# Patient Record
Sex: MALE | Race: BLACK OR AFRICAN AMERICAN | Employment: UNEMPLOYED
[De-identification: names, ages, dates, MRNs, and addresses within clinical notes are randomized per-mention and may not be internally consistent; named-entity substitution may affect disease eponyms.]

---

## 2020-11-17 ENCOUNTER — TRANSCRIBE ORDERS (OUTPATIENT)
Dept: OTHER | Age: 50
End: 2020-11-17

## 2020-11-17 DIAGNOSIS — N52.9 ED (ERECTILE DYSFUNCTION): Primary | ICD-10-CM

## 2020-11-20 NOTE — TELEPHONE ENCOUNTER
MEDICAL RECORDS REQUEST   [facility]  Urology Clinic  [address] [place] [address]  PHONE: [phone]  Fax: [phone]        FUTURE VISIT INFORMATION                                                   Garrett Jewell, : 1970 scheduled for future visit at [facility]    APPOINTMENT INFORMATION:    Date: 12/15/20     Provider:  Karli Lee PA-C    Reason for Visit/Diagnosis: ED    REFERRAL INFORMATION:    Referring provider:  N/A    Specialty: N/A    Referring providers clinic:  [facility] contact number:  N/A    RECORDS REQUESTED FOR VISIT                                                     NOTES  STATUS/DETAILS   OFFICE NOTE from referring provider  yes   OFFICE NOTE from other specialist  no   DISCHARGE SUMMARY from hospital  no   DISCHARGE REPORT from the ER  no   OPERATIVE REPORT  no   MEDICATION LIST  no     PRE-VISIT CHECKLIST      Record collection complete Yes   Appointment appropriately scheduled           (right time/right provider) Yes   MyChart activation If no, please explain: In process   Questionnaire complete If no, please explain: In process      Completed by: Jazmyn Vaughn

## 2020-12-07 ENCOUNTER — PRE VISIT (OUTPATIENT)
Dept: UROLOGY | Facility: CLINIC | Age: 50
End: 2020-12-07

## 2020-12-07 NOTE — TELEPHONE ENCOUNTER
Visit Type : erectile dysfunction    Records/Orders: in epic     Pt Contacted: no    At Rooming: video

## 2020-12-15 ENCOUNTER — PRE VISIT (OUTPATIENT)
Dept: UROLOGY | Facility: CLINIC | Age: 50
End: 2020-12-15

## 2020-12-15 ENCOUNTER — VIRTUAL VISIT (OUTPATIENT)
Dept: UROLOGY | Facility: CLINIC | Age: 50
End: 2020-12-15
Payer: COMMERCIAL

## 2020-12-15 DIAGNOSIS — S14.107S: ICD-10-CM

## 2020-12-15 DIAGNOSIS — Z31.41 FERTILITY TESTING: Primary | ICD-10-CM

## 2020-12-15 PROCEDURE — 99203 OFFICE O/P NEW LOW 30 MIN: CPT | Mod: 95 | Performed by: PHYSICIAN ASSISTANT

## 2020-12-15 RX ORDER — SILDENAFIL 100 MG/1
50 TABLET, FILM COATED ORAL
COMMUNITY
Start: 2020-11-05

## 2020-12-15 RX ORDER — ASPIRIN 325 MG
325 TABLET ORAL
COMMUNITY
Start: 2020-09-18

## 2020-12-15 RX ORDER — TRIAMCINOLONE ACETONIDE 1 MG/G
OINTMENT TOPICAL
COMMUNITY
Start: 2020-09-14

## 2020-12-15 RX ORDER — ACETAMINOPHEN 500 MG
500-1000 TABLET ORAL
COMMUNITY
Start: 2020-11-30

## 2020-12-15 RX ORDER — SIMETHICONE 80 MG
80 TABLET,CHEWABLE ORAL
COMMUNITY
Start: 2020-09-18

## 2020-12-15 RX ORDER — LANOLIN ALCOHOL/MO/W.PET/CERES
3 CREAM (GRAM) TOPICAL
COMMUNITY
Start: 2020-11-30

## 2020-12-15 RX ORDER — TIZANIDINE 2 MG/1
TABLET ORAL
COMMUNITY
Start: 2020-10-05

## 2020-12-15 RX ORDER — GABAPENTIN 100 MG/1
200 CAPSULE ORAL
COMMUNITY
Start: 2020-09-18

## 2020-12-15 ASSESSMENT — PAIN SCALES - GENERAL: PAINLEVEL: NO PAIN (0)

## 2020-12-15 NOTE — LETTER
"12/15/2020       RE: Garrett Jewell  [address]  [facility] [address]     Dear Colleague,    Thank you for referring your patient, Garrett Jewell, to the [facility] UROLOGY CLINIC [place] at [facility]. Please see a copy of my visit note below.    Video Visit Technology for this patient: Well Video Visit- Patient was left in waiting room   Garrett Jewell is a 50 year old male who is being evaluated via a billable video visit.      The patient has been notified of following:     \"This video visit will be conducted via a call between you and your physician/provider. We have found that certain health care needs can be provided without the need for an in-person physical exam.  This service lets us provide the care you need with a video conversation.  If a prescription is necessary we can send it directly to your pharmacy.  If lab work is needed we can place an order for that and you can then stop by our lab to have the test done at a later time.    Video visits are billed at different rates depending on your insurance coverage.  Please reach out to your insurance provider with any questions.    If during the course of the call the physician/provider feels a video visit is not appropriate, you will not be charged for this service.\"    Patient has given verbal consent for Video visit? Yes  How would you like to obtain your AVS? Mail a copy  If you are dropped from the video visit, the video invite should be resent to: Send to e-mail at: [email or web address]  Will anyone else be joining your video visit? No        Name: Garrett Jewell    MRN: [id number]   YOB: 1970                 Chief Complaint:   Fertility testing          History of Present Illness:   Mr. Garrett Jewell is a 50 year old male who is seen via virtual visit in consultation from Dr. Cody for fertility. The patient is not currently  or actively trying " "to conceive but would like to know his chances for when he does. He has a history of an incomplete C7 spinal cord injury in 1993 in [facility] from [facility]; can walk with a cane short distances. Since his SCI, he describes weak ejaculate and reduced volume of ejaculate. When asked if he has any difficulty with erections, he states they are \"not as strong.\" Chart review indicates he has a prescription for Viagra which helps.    It appears that he was given a referral by Dr. Cody for the [facility]rology Laboratory at [facility] on 11/5/2020, but he has not yet completed any semen analyses.    He reports voiding normally and feels to empty his bladder well. No urinary symptoms.          Past Medical History:   No past medical history on file.  Reviewed via Care Everywhere.          Past Surgical History:   No past surgical history on file.  Reviewed via Care Everywhere.          Social History:     Social History     Tobacco Use     Smoking status: Not on file   Substance Use Topics     Alcohol use: Not on file   Reviewed via Care Everywhere.          Family History:   No family history on file.  Reviewed via Care Everywhere.          Allergies:   No Known Allergies         Medications:     Current Outpatient Medications   Medication Sig     acetaminophen (TYLENOL) 500 MG tablet Take 500-1,000 mg by mouth     aspirin (ASA) 325 MG tablet Take 325 mg by mouth     gabapentin (NEURONTIN) 100 MG capsule Take 200 mg by mouth     melatonin 3 MG tablet Take 3 mg by mouth     sildenafil (VIAGRA) 100 MG tablet Take 50 mg by mouth     simethicone (MYLICON) 80 MG chewable tablet Take 80 mg by mouth     terbinafine (LAMISIL) 1 % external cream      tiZANidine (ZANAFLEX) 2 MG tablet TAKE 1-4 PILLS EVERY 6 HOURS AS NEEDED.     triamcinolone (KENALOG) 0.1 % external ointment      No current facility-administered medications for this visit.              Review of Systems:    ROS: 14 point ROS neg other than the symptoms noted above in the HPI " and PMH.          Physical Exam:   GENERAL: Healthy, alert and no distress  EYES: Eyes grossly normal to inspection.  No discharge or erythema, or obvious scleral/conjunctival abnormalities.  RESP: No audible wheeze, cough, or visible cyanosis.  No visible retractions or increased work of breathing.    SKIN: Visible skin clear. No significant rash, abnormal pigmentation or lesions.  NEURO: Cranial nerves grossly intact.  Mentation and speech appropriate for age.  PSYCH: Mentation appears normal, affect normal/bright, judgement and insight intact, normal speech and appearance well-groomed.          Labs:    9/24/2020   PSA   0.23      Imaging:    None         Assessment and Plan:   50 year old male with h/o incomplete C7 SCI in 1993 who requests fertility testing. He is not currently  or actively trying to conceive, but would like to be proactive given the weak ejaculation and decreased volume of ejaculate. Some issues with ED, helped by Viagra.  -Will order semen analysis x 2. Information for Diagnostic [facility]rology Lab mailed out.  -Follow up with Dr. Valdez to review results.       Video-Visit Details    Type of service:  Video Visit    Video Start Time: 12:35 PM  Video End Time: 12:46 PM    Originating Location (pt. Location): Home    Distant Location (provider location):  [facility] UROLOGY CLINIC [place]     Platform used for Video Visit: Koffi Lee PA-C

## 2020-12-15 NOTE — PROGRESS NOTES
"Video Visit Technology for this patient: [facility] Video Visit- Patient was left in waiting room   Garrett Jewell is a 50 year old male who is being evaluated via a billable video visit.      The patient has been notified of following:     \"This video visit will be conducted via a call between you and your physician/provider. We have found that certain health care needs can be provided without the need for an in-person physical exam.  This service lets us provide the care you need with a video conversation.  If a prescription is necessary we can send it directly to your pharmacy.  If lab work is needed we can place an order for that and you can then stop by our lab to have the test done at a later time.    Video visits are billed at different rates depending on your insurance coverage.  Please reach out to your insurance provider with any questions.    If during the course of the call the physician/provider feels a video visit is not appropriate, you will not be charged for this service.\"    Patient has given verbal consent for Video visit? Yes  How would you like to obtain your AVS? Mail a copy  If you are dropped from the video visit, the video invite should be resent to: Send to e-mail at: [email or web address]  Will anyone else be joining your video visit? No        Name: Garrett Jewell    MRN: [id number]   YOB: 1970                 Chief Complaint:   Fertility testing          History of Present Illness:   Mr. Garrett Jewell is a 50 year old male who is seen via virtual visit in consultation from Dr. Cody for fertility. The patient is not currently  or actively trying to conceive but would like to know his chances for when he does. He has a history of an incomplete C7 spinal cord injury in 1993 in [facility] from [facility]; can walk with a cane short distances. Since his SCI, he describes weak ejaculate and reduced volume of ejaculate. When asked if he has any difficulty with erections, he " "states they are \"not as strong.\" Chart review indicates he has a prescription for Viagra which helps.    It appears that he was given a referral by Dr. Cody for the Diagnostic Andrology Laboratory at [facility] on 11/5/2020, but he has not yet completed any semen analyses.    He reports voiding normally and feels to empty his bladder well. No urinary symptoms.          Past Medical History:   No past medical history on file.  Reviewed via Care Everywhere.          Past Surgical History:   No past surgical history on file.  Reviewed via Care Everywhere.          Social History:     Social History     Tobacco Use     Smoking status: Not on file   Substance Use Topics     Alcohol use: Not on file   Reviewed via Care Everywhere.          Family History:   No family history on file.  Reviewed via Care Everywhere.          Allergies:   No Known Allergies         Medications:     Current Outpatient Medications   Medication Sig     acetaminophen (TYLENOL) 500 MG tablet Take 500-1,000 mg by mouth     aspirin (ASA) 325 MG tablet Take 325 mg by mouth     gabapentin (NEURONTIN) 100 MG capsule Take 200 mg by mouth     melatonin 3 MG tablet Take 3 mg by mouth     sildenafil (VIAGRA) 100 MG tablet Take 50 mg by mouth     simethicone (MYLICON) 80 MG chewable tablet Take 80 mg by mouth     terbinafine (LAMISIL) 1 % external cream      tiZANidine (ZANAFLEX) 2 MG tablet TAKE 1-4 PILLS EVERY 6 HOURS AS NEEDED.     triamcinolone (KENALOG) 0.1 % external ointment      No current facility-administered medications for this visit.              Review of Systems:    ROS: 14 point ROS neg other than the symptoms noted above in the HPI and PMH.          Physical Exam:   GENERAL: Healthy, alert and no distress  EYES: Eyes grossly normal to inspection.  No discharge or erythema, or obvious scleral/conjunctival abnormalities.  RESP: No audible wheeze, cough, or visible cyanosis.  No visible retractions or increased work of breathing.    SKIN: Visible " skin clear. No significant rash, abnormal pigmentation or lesions.  NEURO: Cranial nerves grossly intact.  Mentation and speech appropriate for age.  PSYCH: Mentation appears normal, affect normal/bright, judgement and insight intact, normal speech and appearance well-groomed.          Labs:    9/24/2020   PSA   0.23      Imaging:    None         Assessment and Plan:   50 year old male with h/o incomplete C7 SCI in 1993 who requests fertility testing. He is not currently  or actively trying to conceive, but would like to be proactive given the weak ejaculation and decreased volume of ejaculate. Some issues with ED, helped by Viagra.  -Will order semen analysis x 2. Information for Diagnostic Andrology Lab mailed out.  -Follow up with Dr. Valdez to review results.       Video-Visit Details    Type of service:  Video Visit    Video Start Time: 12:35 PM  Video End Time: 12:46 PM    Originating Location (pt. Location): Home    Distant Location (provider location):  [facility]OLOG[facility] [place]     Platform used for Video Visit: Koffi Lee PA-C

## 2020-12-15 NOTE — PATIENT INSTRUCTIONS
UROLOGY CLINIC VISIT PATIENT INSTRUCTIONS    We will order fertility testing (to be done at the Andrology lab - will send directions in the mail) and then follow up with Dr. Valdez to review results.    If you have any issues, questions or concerns in the meantime, do not hesitate to contact us at [phone] or via [employer].     It was a pleasure meeting with you today.  Thank you for allowing me and my team the privilege of caring for you today.  YOU are the reason we are here, and I truly hope we provided you with the excellent service you deserve.  Please let us know if there is anything else we can do for you so that we can be sure you are leaving completely satisfied with your care experience.

## 2021-01-09 ENCOUNTER — HEALTH MAINTENANCE LETTER (OUTPATIENT)
Age: 51
End: 2021-01-09

## 2021-02-18 ENCOUNTER — VIRTUAL VISIT (OUTPATIENT)
Dept: UROLOGY | Facility: CLINIC | Age: 51
End: 2021-02-18
Payer: COMMERCIAL

## 2021-02-18 DIAGNOSIS — Z31.41 FERTILITY TESTING: Primary | ICD-10-CM

## 2021-02-18 PROCEDURE — 99212 OFFICE O/P EST SF 10 MIN: CPT | Mod: 95 | Performed by: UROLOGY

## 2021-02-18 NOTE — LETTER
2/18/2021       RE: Garrett Jewell  [address]  [place] [place] [facility] [id number]     Dear Colleague,    Thank you for referring your patient, Garrett Jewell, to the [facility] UROLOGY [facility] [place] at [facility]. Please see a copy of my visit note below.    Garrett is a 51 year old who is being evaluated via a billable video visit.      Video Visit Technology for this patient: [employer] Video Visit- Patient was left in waiting room      How would you like to obtain your AVS? MyChart  If the video visit is dropped, the invitation should be resent by: Send to e-mail at: [email or web address]  Will anyone else be joining your video visit? No      Video Start Time: 11:43AM  Video-Visit Details    Type of service:  Video Visit    Video End Time:1146AM    Originating Location (pt. Location): Home    Distant Location (provider location):  [facility] UROLOG[facility] [place]     Platform used for Video Visit: Quippi    HPI:  Garrett Jewell is a 51 year old male being seen for fertility workup.    51 year old male with h/o incomplete C7 SCI in 1993 who requests fertility testing. He is not currently  or actively trying to conceive, but would like to be proactive given weak ejaculation and decreased volume of ejaculate. Some issues with ED, helped by Viagra.      Exam:  Exam  General- Alert, oriented, nad.  Pleasant and conversant.  Eyes- anicteric, EOMI.  Resps- normal, non-labored.  No cough  Abdomen-  nondistended.   exam- deferred.   Neurological - no tremors  Skin - no discoloration/ lesions noted  Psychiatric - no anxiety, alert & oriented.       The rest of a comprehensive physical examination is deferred due to PHE (public health emergency) video visit restrictions.      Review of Imaging: N/A     Review of Labs: N/A     Assessment & Plan     Fertility testing  - Testosterone Free and Total; Future  - Follicle stimulating  hormone; Future  - semen analysis   - return to clinic for physical exam and to discuss lab results.    Tez Valdez MD  [facility] UROLOGY CLINIC [place]       ==========================    Additional Billing and Coding Information:  13 minutes spent on the date of the encounter doing chart review, history and exam, documentation and further activities as noted above      ==========================

## 2021-02-18 NOTE — PROGRESS NOTES
Garrett is a 51 year old who is being evaluated via a billable video visit.      Video Visit Technology for this patient: [employer] Video Visit- Patient was left in waiting room      How would you like to obtain your AVS? MyChart  If the video visit is dropped, the invitation should be resent by: Send to e-mail at: [email or web address]  Will anyone else be joining your video visit? No      Video Start Time: 11:43AM  Video-Visit Details    Type of service:  Video Visit    Video End Time:1146AM    Originating Location (pt. Location): Home    Distant Location (provider location):  [facility] UROLOGY CL[facility] [place]     Platform used for Video Visit: TripShake    HPI:  Garrett Jewell is a 51 year old male being seen for fertility workup.    51 year old male with h/o incomplete C7 SCI in 1993 who requests fertility testing. He is not currently  or actively trying to conceive, but would like to be proactive given weak ejaculation and decreased volume of ejaculate. Some issues with ED, helped by Viagra.      Exam:  Exam  General- Alert, oriented, nad.  Pleasant and conversant.  Eyes- anicteric, EOMI.  Resps- normal, non-labored.  No cough  Abdomen-  nondistended.   exam- deferred.   Neurological - no tremors  Skin - no discoloration/ lesions noted  Psychiatric - no anxiety, alert & oriented.       The rest of a comprehensive physical examination is deferred due to PHE (public health emergency) video visit restrictions.      Review of Imaging: N/A     Review of Labs: N/A     Assessment & Plan     Fertility testing  - Testosterone Free and Total; Future  - Follicle stimulating hormone; Future  - semen analysis   - return to clinic for physical exam and to discuss lab results.    Tez Valdez MD  [facility]OLOG[facility] [place]       ==========================    Additional Billing and Coding Information:  13 minutes spent on the date of the encounter doing chart review, history and exam,  documentation and further activities as noted above      ==========================

## 2021-02-19 NOTE — PATIENT INSTRUCTIONS
Please schedule SA and blood labs in the near future. Dr. Valdez will want to see you in clinic in about 4 weeks after we've received these labs.     It was a pleasure meeting with you today.  Thank you for allowing me and my team the privilege of caring for you today.  YOU are the reason we are here, and I truly hope we provided you with the excellent service you deserve.  Please let us know if there is anything else we can do for you so that we can be sure you are leaving completely satisfied with your care experience.

## 2021-02-22 ENCOUNTER — PRE VISIT (OUTPATIENT)
Dept: UROLOGY | Facility: CLINIC | Age: 51
End: 2021-02-22

## 2021-02-22 DIAGNOSIS — Z31.41 FERTILITY TESTING: ICD-10-CM

## 2021-02-22 LAB — FSH SERPL-ACNC: 68.2 IU/L (ref 0.7–10.8)

## 2021-02-22 PROCEDURE — 84403 ASSAY OF TOTAL TESTOSTERONE: CPT | Mod: 90 | Performed by: PATHOLOGY

## 2021-02-22 PROCEDURE — 83001 ASSAY OF GONADOTROPIN (FSH): CPT | Performed by: PATHOLOGY

## 2021-02-22 PROCEDURE — 36415 COLL VENOUS BLD VENIPUNCTURE: CPT | Performed by: PATHOLOGY

## 2021-02-22 PROCEDURE — 84270 ASSAY OF SEX HORMONE GLOBUL: CPT | Performed by: PATHOLOGY

## 2021-02-22 NOTE — TELEPHONE ENCOUNTER
Reason for Visit: Follow up lab review and exam    Orders/Procedures/Records: in system    Contact Patient: n/a    Rooming Requirements: normal      Valery Jones LPN  02/22/21  3:09 PM

## 2021-02-24 LAB
SHBG SERPL-SCNC: 74 NMOL/L (ref 11–80)
TESTOST FREE SERPL-MCNC: 5.08 NG/DL (ref 4.7–24.4)
TESTOST SERPL-MCNC: 440 NG/DL (ref 240–950)

## 2021-02-26 ENCOUNTER — OFFICE VISIT (OUTPATIENT)
Dept: UROLOGY | Facility: CLINIC | Age: 51
End: 2021-02-26
Payer: COMMERCIAL

## 2021-02-26 ENCOUNTER — TRANSFERRED RECORDS (OUTPATIENT)
Dept: HEALTH INFORMATION MANAGEMENT | Facility: CLINIC | Age: 51
End: 2021-02-26

## 2021-02-26 VITALS — BODY MASS INDEX: 23.39 KG/M2 | HEIGHT: 67 IN | WEIGHT: 149 LBS

## 2021-02-26 DIAGNOSIS — E29.1 TESTICULAR HYPOFUNCTION: ICD-10-CM

## 2021-02-26 DIAGNOSIS — S14.107S: ICD-10-CM

## 2021-02-26 DIAGNOSIS — N50.0 ATROPHY OF BOTH TESTES: Primary | ICD-10-CM

## 2021-02-26 DIAGNOSIS — Z31.41 FERTILITY TESTING: ICD-10-CM

## 2021-02-26 LAB
LOCATION PERFORMED: NORMAL
MISCELLANEOUS TEST: NORMAL
MISCELLANEOUS TEST: NORMAL
RESULT: NORMAL
SEND OUTS MISC TEST CODE: NORMAL
SEND OUTS MISC TEST SPECIMEN: NORMAL
TEST NAME: NORMAL

## 2021-02-26 PROCEDURE — 99214 OFFICE O/P EST MOD 30 MIN: CPT | Performed by: UROLOGY

## 2021-02-26 ASSESSMENT — MIFFLIN-ST. JEOR: SCORE: 1484.49

## 2021-02-26 ASSESSMENT — PAIN SCALES - GENERAL: PAINLEVEL: NO PAIN (0)

## 2021-02-26 NOTE — NURSING NOTE
"Chief Complaint   Patient presents with     Follow Up     Follow up lab review and exam       Height 1.702 m (5' 7\"), weight 67.6 kg (149 lb). Body mass index is 23.34 kg/m .    There is no problem list on file for this patient.      No Known Allergies    Current Outpatient Medications   Medication Sig Dispense Refill     acetaminophen (TYLENOL) 500 MG tablet Take 500-1,000 mg by mouth       aspirin (ASA) 325 MG tablet Take 325 mg by mouth       gabapentin (NEURONTIN) 100 MG capsule Take 200 mg by mouth       melatonin 3 MG tablet Take 3 mg by mouth       sildenafil (VIAGRA) 100 MG tablet Take 50 mg by mouth       simethicone (MYLICON) 80 MG chewable tablet Take 80 mg by mouth       terbinafine (LAMISIL) 1 % external cream        tiZANidine (ZANAFLEX) 2 MG tablet TAKE 1-4 PILLS EVERY 6 HOURS AS NEEDED.       triamcinolone (KENALOG) 0.1 % external ointment          Social History     Tobacco Use     Smoking status: Never Smoker     Smokeless tobacco: Never Used   Substance Use Topics     Alcohol use: None     Drug use: None       Valery Jones LPN  2/26/2021  6:59 AM  "
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
## 2021-03-05 LAB — LAB SCANNED RESULT: NORMAL

## 2021-03-11 DIAGNOSIS — Z31.41 ENCOUNTER FOR SPERM COUNT FOR FERTILITY TESTING: Primary | ICD-10-CM

## 2021-03-11 LAB
ABSTINENCE DAYS: 15 DAYS (ref 2–7)
AGGLUTINATION: ABNORMAL YES/NO
ANALYSIS TEMP - CENTIGRADE: 24 CENTIGRADE
CELL FRAGMENTS: ABNORMAL %
COLLECTION METHOD: ABNORMAL
COLLECTION SITE: ABNORMAL
CONSENT TO RELEASE TO PARTNER: NO
IMMATURE SPERM: ABNORMAL %
IMMOTILE: 0 %
LAB RECEIPT TIME: ABNORMAL
LIQUEFIED: YES YES/NO
NON-PROGRESSIVE MOTILITY: 0 %
PROGRESSIVE MOTILITY: 0 % (ref 32–?)
ROUND CELLS: 0 MILLION/ML (ref ?–2)
SPECIMEN CONCENTRATION: 0 MILLION/ML (ref 15–?)
SPECIMEN PH: 7.2 PH (ref 7.2–?)
SPECIMEN TYPE: ABNORMAL
SPECIMEN VOL UR: 1.5 ML (ref 1.5–?)
TIME OF ANALYSIS: ABNORMAL
TOTAL NUMBER: 0 MILLION (ref 39–?)
TOTAL PROGRESSIVE MOTILE: 0 MILLION (ref 15.6–?)
VISCOUS: NO YES/NO
WBC SPECIMEN: ABNORMAL %

## 2021-03-11 PROCEDURE — 89260 SPERM ISOLATION SIMPLE: CPT

## 2021-03-12 LAB — LAB SCANNED RESULT: NORMAL

## 2021-03-16 NOTE — RESULT ENCOUNTER NOTE
Chapincito Thomason,    Can you please send the patient these normal genetics results?  I'm happy to see him back to discuss TESE options.  Thank You      Thanks,    April LOPEZ

## 2021-10-11 ENCOUNTER — HEALTH MAINTENANCE LETTER (OUTPATIENT)
Age: 51
End: 2021-10-11

## 2022-01-30 ENCOUNTER — HEALTH MAINTENANCE LETTER (OUTPATIENT)
Age: 52
End: 2022-01-30

## 2022-09-24 ENCOUNTER — HEALTH MAINTENANCE LETTER (OUTPATIENT)
Age: 52
End: 2022-09-24

## 2023-05-08 ENCOUNTER — HEALTH MAINTENANCE LETTER (OUTPATIENT)
Age: 53
End: 2023-05-08